# Patient Record
Sex: FEMALE | Race: WHITE | NOT HISPANIC OR LATINO | Employment: PART TIME | URBAN - METROPOLITAN AREA
[De-identification: names, ages, dates, MRNs, and addresses within clinical notes are randomized per-mention and may not be internally consistent; named-entity substitution may affect disease eponyms.]

---

## 2021-10-09 ENCOUNTER — APPOINTMENT (OUTPATIENT)
Dept: CT IMAGING | Facility: CLINIC | Age: 56
End: 2021-10-09
Payer: COMMERCIAL

## 2021-10-09 ENCOUNTER — HOSPITAL ENCOUNTER (EMERGENCY)
Facility: CLINIC | Age: 56
Discharge: HOME | End: 2021-10-09
Admitting: PHYSICIAN ASSISTANT
Payer: COMMERCIAL

## 2021-10-09 VITALS
DIASTOLIC BLOOD PRESSURE: 70 MMHG | OXYGEN SATURATION: 98 % | HEART RATE: 76 BPM | RESPIRATION RATE: 16 BRPM | TEMPERATURE: 98.8 F | WEIGHT: 193 LBS | SYSTOLIC BLOOD PRESSURE: 106 MMHG

## 2021-10-09 DIAGNOSIS — H60.391 INFECTIVE OTITIS EXTERNA, RIGHT: ICD-10-CM

## 2021-10-09 DIAGNOSIS — H66.90 OTITIS MEDIA: ICD-10-CM

## 2021-10-09 PROBLEM — E11.9 CONTROLLED TYPE 2 DIABETES MELLITUS WITHOUT COMPLICATION, WITHOUT LONG-TERM CURRENT USE OF INSULIN (H): Status: ACTIVE | Noted: 2020-10-14

## 2021-10-09 PROBLEM — R73.09 ELEVATED HEMOGLOBIN A1C: Status: ACTIVE | Noted: 2019-02-19

## 2021-10-09 PROBLEM — R73.03 BORDERLINE DIABETES MELLITUS: Status: ACTIVE | Noted: 2017-12-26

## 2021-10-09 LAB
C REACTIVE PROTEIN LHE: 3.5 MG/DL (ref 0–0.8)
ERYTHROCYTE [DISTWIDTH] IN BLOOD BY AUTOMATED COUNT: 13.3 % (ref 10–15)
ERYTHROCYTE [SEDIMENTATION RATE] IN BLOOD BY WESTERGREN METHOD: 32 MM/HR (ref 0–20)
HCT VFR BLD AUTO: 42.2 % (ref 35–47)
HGB BLD-MCNC: 13.7 G/DL (ref 11.7–15.7)
MCH RBC QN AUTO: 29.7 PG (ref 26.5–33)
MCHC RBC AUTO-ENTMCNC: 32.5 G/DL (ref 31.5–36.5)
MCV RBC AUTO: 91 FL (ref 78–100)
PLATELET # BLD AUTO: 457 10E3/UL (ref 150–450)
RBC # BLD AUTO: 4.62 10E6/UL (ref 3.8–5.2)
WBC # BLD AUTO: 13.5 10E3/UL (ref 4–11)

## 2021-10-09 PROCEDURE — 250N000013 HC RX MED GY IP 250 OP 250 PS 637: Performed by: PHYSICIAN ASSISTANT

## 2021-10-09 PROCEDURE — 250N000011 HC RX IP 250 OP 636: Performed by: PHYSICIAN ASSISTANT

## 2021-10-09 PROCEDURE — 85652 RBC SED RATE AUTOMATED: CPT | Performed by: PHYSICIAN ASSISTANT

## 2021-10-09 PROCEDURE — 96375 TX/PRO/DX INJ NEW DRUG ADDON: CPT

## 2021-10-09 PROCEDURE — 36415 COLL VENOUS BLD VENIPUNCTURE: CPT | Performed by: PHYSICIAN ASSISTANT

## 2021-10-09 PROCEDURE — 99285 EMERGENCY DEPT VISIT HI MDM: CPT | Mod: 25

## 2021-10-09 PROCEDURE — 70480 CT ORBIT/EAR/FOSSA W/O DYE: CPT

## 2021-10-09 PROCEDURE — 85014 HEMATOCRIT: CPT | Performed by: PHYSICIAN ASSISTANT

## 2021-10-09 PROCEDURE — 258N000003 HC RX IP 258 OP 636: Performed by: PHYSICIAN ASSISTANT

## 2021-10-09 PROCEDURE — 86141 C-REACTIVE PROTEIN HS: CPT | Performed by: PHYSICIAN ASSISTANT

## 2021-10-09 PROCEDURE — 96374 THER/PROPH/DIAG INJ IV PUSH: CPT

## 2021-10-09 PROCEDURE — 96361 HYDRATE IV INFUSION ADD-ON: CPT

## 2021-10-09 RX ORDER — CIPROFLOXACIN 500 MG/1
500 TABLET, FILM COATED ORAL 2 TIMES DAILY
Qty: 14 TABLET | Refills: 0 | Status: SHIPPED | OUTPATIENT
Start: 2021-10-09 | End: 2021-10-16

## 2021-10-09 RX ORDER — HYDROMORPHONE HYDROCHLORIDE 1 MG/ML
0.5 INJECTION, SOLUTION INTRAMUSCULAR; INTRAVENOUS; SUBCUTANEOUS ONCE
Status: COMPLETED | OUTPATIENT
Start: 2021-10-09 | End: 2021-10-09

## 2021-10-09 RX ORDER — MORPHINE SULFATE 4 MG/ML
4 INJECTION, SOLUTION INTRAMUSCULAR; INTRAVENOUS ONCE
Status: COMPLETED | OUTPATIENT
Start: 2021-10-09 | End: 2021-10-09

## 2021-10-09 RX ORDER — CIPROFLOXACIN 500 MG/1
500 TABLET, FILM COATED ORAL ONCE
Status: COMPLETED | OUTPATIENT
Start: 2021-10-09 | End: 2021-10-09

## 2021-10-09 RX ORDER — OXYCODONE HYDROCHLORIDE 5 MG/1
5 TABLET ORAL EVERY 8 HOURS PRN
Qty: 8 TABLET | Refills: 0 | Status: SHIPPED | OUTPATIENT
Start: 2021-10-09

## 2021-10-09 RX ORDER — OFLOXACIN 3 MG/ML
5 SOLUTION AURICULAR (OTIC) ONCE
Status: DISCONTINUED | OUTPATIENT
Start: 2021-10-09 | End: 2021-10-09

## 2021-10-09 RX ORDER — CIPROFLOXACIN AND DEXAMETHASONE 3; 1 MG/ML; MG/ML
4 SUSPENSION/ DROPS AURICULAR (OTIC) ONCE
Status: COMPLETED | OUTPATIENT
Start: 2021-10-09 | End: 2021-10-09

## 2021-10-09 RX ADMIN — CIPROFLOXACIN 500 MG: 500 TABLET, FILM COATED ORAL at 16:22

## 2021-10-09 RX ADMIN — HYDROMORPHONE HYDROCHLORIDE 0.5 MG: 1 INJECTION, SOLUTION INTRAMUSCULAR; INTRAVENOUS; SUBCUTANEOUS at 14:36

## 2021-10-09 RX ADMIN — CIPROFLOXACIN AND DEXAMETHASONE 4 DROP: 3; 1 SUSPENSION/ DROPS AURICULAR (OTIC) at 16:59

## 2021-10-09 RX ADMIN — SODIUM CHLORIDE 1000 ML: 9 INJECTION, SOLUTION INTRAVENOUS at 12:55

## 2021-10-09 RX ADMIN — MORPHINE SULFATE 4 MG: 4 INJECTION, SOLUTION INTRAMUSCULAR; INTRAVENOUS at 12:41

## 2021-10-09 ASSESSMENT — ENCOUNTER SYMPTOMS
MYALGIAS: 1
NAUSEA: 1
VOMITING: 0
HEADACHES: 0
CHILLS: 0
FEVER: 0
FACIAL SWELLING: 1

## 2021-10-09 NOTE — ED PROVIDER NOTES
EMERGENCY DEPARTMENT ENCOUNTER      NAME: Princess Zhang  AGE: 55 year old female  YOB: 1965  MRN: [id number]  EVALUATION DATE & TIME: 10/9/2021 11:28 AM    PCP: No primary care provider on file.    ED PROVIDER: Shantelle Rodney PA-C      Chief Complaint   Patient presents with     Otalgia     Facial Swelling         FINAL IMPRESSION:  1. Infective otitis externa, right    2. Otitis media          MEDICAL DECISION MAKING:    Pertinent Labs & Imaging studies reviewed. (See chart for details)  55 year old female with history of diet controlled T2DM presents to the Emergency Department for evaluation of right ear pain and swelling x 5 days. Has been treating herself with some leftover Keflex for the last 3 days without improvement. Denies fevers, chills, vomiting, headache.     Vitals reviewed and unremarkable. Patient non-toxic appearing and in no acute distress. Right EAC with moderate to severe edema, unable to visualize TM. Edema throughout auricle that extends into her right preauricular area with associated tenderness. Preauricular lymphadenopathy. No tenderness, crepitus, or erythema of the mastoid. Normal sensation throughout face, able to perform facial expressions with no motor function deficit. Differential diagnosis includes but not limited to otitis externa, malignant otitis externa, mastoiditis, herpes zoster, osteomyelitis, deep space abscess.     White count 13.5, CRP 3.5, ESR 32. CT showing right sided acute otitis externa with otomastoiditis with near complete opacification of the mastoid and middle ear cavities. No erosions. No fluid collections. Also noted to have sphenoid sinusitis. I discussed with Dr. Garcia with ENT. Plan to place ear wick and put her on ciprodex drops and oral ciprofloxacin with follow up in clinic on Monday. Ear wick placed successfully. Will send patient home with a few tablets of oxycodone given significant amount of pain she is in. We discussed return  precautions and importance of follow up. Patient discharged home in stable condition.     0 minutes of critical care    ED COURSE:  11:31 AM The PA student met the patient to gather history and perform his initial exam.  12:06 PM I met with the patient, obtained history, performed an initial exam, and discussed options and plan for diagnostics and treatment here in the ED. PPE worn: surgical mask, gloves.  3:09 PM I discussed the case with Dr. Garcia from ENT.   3:20 PM I rechecked the patient and updated her with results.     At the conclusion of the encounter I discussed the results of all of the tests and the disposition. The questions were answered. The patient acknowledged understanding and was agreeable with the care plan.     MEDICATIONS GIVEN IN THE EMERGENCY:  Medications   0.9% sodium chloride BOLUS (0 mLs Intravenous Stopped 10/9/21 1330)   morphine (PF) injection 4 mg (4 mg Intravenous Given 10/9/21 1241)   HYDROmorphone (PF) (DILAUDID) injection 0.5 mg (0.5 mg Intravenous Given 10/9/21 1436)   ciprofloxacin-dexamethasone (CIPRODEX) 0.3-0.1 % otic suspension 4 drop (4 drops Right Ear Given 10/9/21 1659)   ciprofloxacin (CIPRO) tablet 500 mg (500 mg Oral Given 10/9/21 1622)       NEW PRESCRIPTIONS STARTED AT TODAY'S ER VISIT  Discharge Medication List as of 10/9/2021  4:51 PM      START taking these medications    Details   ciprofloxacin (CIPRO) 500 MG tablet Take 1 tablet (500 mg) by mouth 2 times daily for 7 days, Disp-14 tablet, R-0, Local Print      oxyCODONE (ROXICODONE) 5 MG tablet Take 1 tablet (5 mg) by mouth every 8 hours as needed for pain, Disp-8 tablet, R-0, Local Print                  =================================================================    HPI:    Patient information was obtained from: patient    Use of Interpretor: N/A        Princess LAWERNCE Vicente is a 55 year old female with a pertinent history of borderline diabetes mellitus who presents to this ED via walk-in for evaluation of  ear pain and facial swelling.    Patient reports right ear pain and right sided facial swelling which began 5 days ago (10/4). She first noticed a small boil near her right ear that has been increasing in size. Patient states the entire right side of her face is very painful and tender. Her pain is constant and radiates throughout the right side of her face. She has been self medicating with leftover Cephalexin from a prior prescription. Has been taking Cephalexin QID x3 days without improvement. Reports decreased hearing from her right ear. Also reports nausea and body aches. Denies fever, chills, vomiting, headache.       REVIEW OF SYSTEMS:  Review of Systems   Constitutional: Negative for chills and fever.   HENT: Positive for ear pain (right), facial swelling (right) and hearing loss (right). Negative for sore throat and trouble swallowing.    Gastrointestinal: Positive for nausea. Negative for vomiting.   Musculoskeletal: Positive for myalgias. Negative for neck pain and neck stiffness.   Neurological: Negative for headaches.   All other systems reviewed and are negative.      PAST MEDICAL HISTORY:  Past Medical History:   Diagnosis Date     Anxiety      Borderline diabetes mellitus      CAD (coronary artery disease)      Depressive disorder      Hypertension      Mixed hyperlipidemia      Obesity      LAY (obstructive sleep apnea)      Tobacco use disorder        PAST SURGICAL HISTORY:  History reviewed. No pertinent surgical history.        CURRENT MEDICATIONS:    No current facility-administered medications for this encounter.    Current Outpatient Medications:      ciprofloxacin (CIPRO) 500 MG tablet, Take 1 tablet (500 mg) by mouth 2 times daily for 7 days, Disp: 14 tablet, Rfl: 0     oxyCODONE (ROXICODONE) 5 MG tablet, Take 1 tablet (5 mg) by mouth every 8 hours as needed for pain, Disp: 8 tablet, Rfl: 0      ALLERGIES:  Allergies   Allergen Reactions     Atorvastatin Muscle Pain (Myalgia)     Simvastatin  Other (See Comments)     Leg cramps     Penicillins Rash       FAMILY HISTORY:  History reviewed. No pertinent family history.    SOCIAL HISTORY:   Social History     Socioeconomic History     Marital status:      Spouse name: None     Number of children: None     Years of education: None     Highest education level: None   Occupational History     None   Tobacco Use     Smoking status: Current Every Day Smoker     Packs/day: 0.04     Years: 36.00     Pack years: 1.44     Types: Cigarettes     Smokeless tobacco: Never Used   Substance and Sexual Activity     Alcohol use: Never     Drug use: None     Sexual activity: None   Other Topics Concern     None   Social History Narrative     None     Social Determinants of Health     Financial Resource Strain:      Difficulty of Paying Living Expenses:    Food Insecurity:      Worried About Running Out of Food in the Last Year:      Ran Out of Food in the Last Year:    Transportation Needs:      Lack of Transportation (Medical):      Lack of Transportation (Non-Medical):    Physical Activity:      Days of Exercise per Week:      Minutes of Exercise per Session:    Stress:      Feeling of Stress :    Social Connections:      Frequency of Communication with Friends and Family:      Frequency of Social Gatherings with Friends and Family:      Attends [employer] Services:      Active Member of Clubs or Organizations:      Attends Club or Organization Meetings:      Marital Status:    Intimate Partner Violence:      Fear of Current or Ex-Partner:      Emotionally Abused:      Physically Abused:      Sexually Abused:        VITALS:  Patient Vitals for the past 24 hrs:   BP Temp Temp src Pulse Resp SpO2 Weight   10/09/21 1700 106/70 -- -- 76 16 98 % --   10/09/21 1624 107/69 -- -- 77 18 97 % --   10/09/21 1254 102/63 -- -- 78 18 97 % --   10/09/21 1127 96/73 98.8  F (37.1  C) Oral 86 18 96 % 87.5 kg (193 lb)       PHYSICAL EXAM    Constitutional: Well developed, obese, NAD,  smells of tobacco  HENT: Normocephalic, Atraumatic, Right EAC with moderate to severe edema, unable to visualize TM. Edema throughout auricle that extends into her right preauricular area with associated tenderness. Preauricular lymphadenopathy. No tenderness, crepitus, or erythema of the mastoid. Left TM/EAC normal. Oropharynx normal. Floor of mouth and submandibular area is soft. mucous membranes moist, Nose normal. Normal sensation throughout face. Able to perform facial expressions.   Neck: Normal range of motion, No tenderness, Supple, No stridor.   Eyes: Conjunctiva normal, No discharge.   Respiratory: Normal breath sounds, No respiratory distress, No wheezing, Speaks full sentences easily. No cough.   Cardiovascular: Normal heart rate, Regular rhythm, No murmurs, No rubs, No gallops. Chest wall nontender.   GI: Soft, No tenderness, No masses, No flank tenderness. No rebound or guarding.   Musculoskeletal: 2+ DP pulses. No edema. No cyanosis, No clubbing. Moves all extremities spontaneously.   Integument: Warm, Dry, No erythema, No rash. No petechiae.  Neurologic: Alert & oriented x 3, Normal motor function, Normal sensory function, No focal deficits noted. Normal gait.   Psychiatric: Affect normal, Judgment normal, Mood normal. Cooperative.    LAB:  All pertinent labs reviewed and interpreted.  Recent Results (from the past 24 hour(s))   CBC (+ platelets, no diff)    Collection Time: 10/09/21 12:40 PM   Result Value Ref Range    WBC Count 13.5 (H) 4.0 - 11.0 10e3/uL    RBC Count 4.62 3.80 - 5.20 10e6/uL    Hemoglobin 13.7 11.7 - 15.7 g/dL    Hematocrit 42.2 35.0 - 47.0 %    MCV 91 78 - 100 fL    MCH 29.7 26.5 - 33.0 pg    MCHC 32.5 31.5 - 36.5 g/dL    RDW 13.3 10.0 - 15.0 %    Platelet Count 457 (H) 150 - 450 10e3/uL   Erythrocyte sedimentation rate auto    Collection Time: 10/09/21 12:40 PM   Result Value Ref Range    Erythrocyte Sedimentation Rate 32 (H) 0 - 20 mm/hr   CRP inflammation    Collection Time:  10/09/21 12:40 PM   Result Value Ref Range    CRP 3.5 (H) 0.0-<0.8 mg/dL     RADIOLOGY:  Reviewed all pertinent imaging. Please see official radiology report.  CT Temporal Bones wo Contrast   Final Result   IMPRESSION:   1.  Right side acute otitis externa with circumferential soft tissue thickening throughout the external auditory canal and inflammatory change within the periauricular soft tissues. No fluid collection.   2.  Right-sided otomastoiditis with near complete opacification of the mastoid and middle ear cavities. No erosions.   3.  Sphenoid sinusitis with circumferential mucosal thickening and fluid level.          I, Gutierrez Miller, am serving as a scribe to document services personally performed by Shantelle Rodney PA-C based on my observation and the provider's statements to me. I, Shantelle Rodney PA-C attest that Gutierrez Miller is acting in a scribe capacity, has observed my performance of the services and has documented them in accordance with my direction.    Shantelle Rodney PA-C  Emergency Medicine  [facility]  10/9/2021     Shantelle Rodney PA-C  10/12/21 0903

## 2021-10-09 NOTE — ED TRIAGE NOTES
Patient reports that she has had R sided facial swelling and R ear pain since Monday, has been self medicating with Cephalexin @ home with no relief.  Moraima Maldonado RN.......10/9/2021 11:27 AM

## 2021-10-09 NOTE — PROGRESS NOTES
I spoke with Shantelle Rodney PA-C regarding this patient.  She is a 55-year-old female, history of type 2 diabetes controlled with diet.  Several day history of right facial pain, right ear pain, facial swelling, discomfort.  They obtained a CT scan of her temporal bone today which showed swelling of the external auditory canal, middle ear ear and mastoid opacification.  There is no bony destruction or coalescence.  She does not have any cranial neuropathy or signs of malignant otitis externa.    Given that the patient is treatment naïve, we would consider outpatient management.  In this specific instance, we would recommend put a topical medication and oral antibiotics.  She would be a borderline candidate for oral prednisone given her diabetes history.  In general he would recommend placing otowick in the right ear to allow the drops to get towards the eardrum.  If covered by insurance, Ciprodex would be ideal.  Good antibiotic coverage orally for otitis media/externa in this instance would be oral Augmentin, oral ciprofloxacin, clindamycin.  Patient has a remote penicillin allergy listed (rash).  If oral prednisone will not be given, ciprofloxacin 500 mg twice daily for 10 days would be a reasonable course.    Adequate pain control recommended including oral Tylenol, or ibuprofen, and potential narcotic pain medicine to help with breakthrough pain.  The patient needs to follow-up with an ENT provider early next week in clinic for examination and debridement.  She can be seen in our outpatient ENT clinic in [facility] ([phone]) or at the [facility] ([phone]).    Summary of recommendations     1. Acetaminophen 1000 mg every 6 hours as needed  2. Ibuprofen 600 mg every 6 hours as needed  3. Oral narcotic pain medication for breakthrough pain as needed  4. Oral antibiotic as prescribed  5. Ciprodex drops 5 drops to draining ear twice daily for 14 days  6. Ear debridement in ENT clinic in  3-5 days     Jeronimo Garcia MD  Department of Otolaryngology-Head and Neck Surgery  [facility]

## 2021-10-09 NOTE — DISCHARGE INSTRUCTIONS
Tylenol 1000 mg every 8 hours  Ibuprofen 600 mg every 8 hours  Oxycodone 5 mg every 8 hours as needed for severe pain  Drops: 4 drops twice daily for 7 days  Ciprofloxacin: 500 mg twice daily for 7 days.     Call to schedule follow up with ENT on Monday or Tuesday

## 2021-10-12 ASSESSMENT — ENCOUNTER SYMPTOMS
NECK STIFFNESS: 0
SORE THROAT: 0
NECK PAIN: 0
TROUBLE SWALLOWING: 0

## 2023-10-25 ENCOUNTER — HOSPITAL ENCOUNTER (EMERGENCY)
Facility: CLINIC | Age: 58
Discharge: HOME | End: 2023-10-25
Attending: STUDENT IN AN ORGANIZED HEALTH CARE EDUCATION/TRAINING PROGRAM | Admitting: STUDENT IN AN ORGANIZED HEALTH CARE EDUCATION/TRAINING PROGRAM
Payer: COMMERCIAL

## 2023-10-25 VITALS
SYSTOLIC BLOOD PRESSURE: 112 MMHG | OXYGEN SATURATION: 98 % | HEIGHT: 64 IN | BODY MASS INDEX: 31.58 KG/M2 | DIASTOLIC BLOOD PRESSURE: 69 MMHG | HEART RATE: 71 BPM | WEIGHT: 185 LBS | TEMPERATURE: 98.5 F | RESPIRATION RATE: 20 BRPM

## 2023-10-25 DIAGNOSIS — H53.8 BLURRED VISION: ICD-10-CM

## 2023-10-25 PROCEDURE — 99282 EMERGENCY DEPT VISIT SF MDM: CPT

## 2023-10-25 ASSESSMENT — ENCOUNTER SYMPTOMS
SHORTNESS OF BREATH: 0
HEADACHES: 1

## 2023-10-25 ASSESSMENT — ACTIVITIES OF DAILY LIVING (ADL): ADLS_ACUITY_SCORE: 33

## 2023-10-25 NOTE — DISCHARGE INSTRUCTIONS
You may still have an issue occurring such as a TIA or something more serious.  It is not possible for us to evaluate that without doing an MRI at this time.    Following up with Dr. Avery tomorrow is appropriate and may help with the diagnosis.  In addition you wlll need to see Dr. Sosa and perhaps a neurologist and an eye doctor for your symptoms.    Please return to the ER if your symtpoms worsen or you have any other neurologic symptoms.

## 2023-10-25 NOTE — ED TRIAGE NOTES
"Arrives to ED with c/o intermittent blurry vision to L eye over past 2 weeks. Denies vision changes at this time. 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 Aneurysm has been coiled. \"I had surgery in June to remove the blockages\" from bilat carotid arteries. Reports has had L sided facial droop since procedure in June, \"they said it's supposed to get better\". Denies N/T. Strength equal to BUE/BLE.      Triage Assessment (Adult)       Row Name 10/25/23 1216          Triage Assessment    Airway WDL WDL        Respiratory WDL    Respiratory WDL WDL        Skin Circulation/Temperature WDL    Skin Circulation/Temperature WDL WDL        Cardiac WDL    Cardiac WDL WDL        Peripheral/Neurovascular WDL    Peripheral Neurovascular WDL WDL        Cognitive/Neuro/Behavioral WDL    Cognitive/Neuro/Behavioral WDL WDL                     "

## 2023-10-25 NOTE — ED PROVIDER NOTES
EMERGENCY DEPARTMENT ENCOUNTER      NAME: Princess Zhang  AGE: 58 year old female  YOB: 1965  MRN: [id number]  EVALUATION DATE & TIME: No admission date for patient encounter.    PCP: Cassi Sosa    ED PROVIDER: Frank Corona M.D.      Chief Complaint   Patient presents with    Eye Problem         FINAL IMPRESSION:  1. Blurred vision          ED COURSE & MEDICAL DECISION MAKIN:40 PM Met and evaluated patient    Pertinent Labs & Imaging studies reviewed. (See chart for details)  58 year old female presents to the Emergency Department for evaluation of blurred vision in her eye.  Patient has a complicated history of having an aneurysm which was coiled of years ago.  She has not had any headache.  No other strokelike symptoms.  Patient did have previous carotid surgery and is known to have carotid disease.  She had talked to Dr. Avery earlier this week who had suggested carotid ultrasounds or to be seen in the emergency department for work-up.  Patient had not seen a physician or been to an ER since then and does have an appointment tomorrow to see Dr. Avery and to have an ultrasound.  Today she was here in the emergency department with her mother and decided to be seen so we could consider imaging or further work-up.  I did suggest to her that MRI of her brain and carotids would be valuable to rule out an acute stroke.  Do not believe that this represents any sort of ophthalmic artery issue.  Also do not believe that diabetes or any other systemic issues causing the blurred vision.  Had a lengthy discussion with the patient regarding the need for MRI and the fact that it might take some time to get that done and after our discussion the patient ultimately decided that she did not want to wait for the MRI and would like to leave and follow-up with Dr. Avery tomorrow.  She knows the risk that she could be in fact having a stroke or even a TIA and that we cannot rule this out without  a MRI.    At the conclusion of the encounter I discussed the results of all of the tests and the disposition. The questions were answered. The patient or family acknowledged understanding and was agreeable with the care plan.              Medical Decision Making    History:  Supplemental history from: Documented in chart, if applicable  External Record(s) reviewed: Documented in chart, if applicable.    Work Up:  Chart documentation includes differential considered and any EKGs or imaging independently interpreted by provider, where specified.  In additional to work up documented, I considered the following work up: Documented in chart, if applicable.    External consultation:  Discussion of management with another provider: Documented in chart, if applicable    Complicating factors:  Care impacted by chronic illness: Hyperlipidemia, Hypertension, and Mental Health  Care affected by social determinants of health: N/A    Disposition considerations: Discharge. No recommendations on prescription strength medication(s). See documentation for any additional details.        This patient involved a high degree of complexity in medical decision making, as significant risks were present and assessed. Recent encounters & results in medical record reviewed by me.     All workup (i.e. any EKG/labs/imaging as per charting below) reviewed and independently interpreted by me. See respective sections for details.       minutes of critical care time     MEDICATIONS GIVEN IN THE EMERGENCY:  Medications - No data to display    NEW PRESCRIPTIONS STARTED AT TODAY'S ER VISIT  Discharge Medication List as of 10/25/2023  1:05 PM             =================================================================    HPI    Patient information was obtained from: Patient    Use of : Rosa LAWRENCE Vicente is a 58 year old female with a pertinent history of hypertension, mixed hyperlipidemia, CAD who presents for evaluation of eye  "problem.     The patient reports that for the past 3 weeks she has been experiencing blurred vision in her right eye. She explained that once a day, for 3 minutes she experiences a sudden onset of blurry vision and feels off balance. These episodes have not worsened. Of note, she reported that she started new blood pressure medication and experiences bad headaches from it.       REVIEW OF SYSTEMS   Review of Systems   Eyes:  Positive for visual disturbance (blurred vision).   Respiratory:  Negative for shortness of breath.    Cardiovascular:  Negative for chest pain.   Neurological:  Positive for headaches.   All other systems reviewed and are negative.      PAST MEDICAL HISTORY:  Past Medical History:   Diagnosis Date    Anxiety     Borderline diabetes mellitus     CAD (coronary artery disease)     Depressive disorder     Hypertension     Mixed hyperlipidemia     Obesity     LAY (obstructive sleep apnea)     Tobacco use disorder        PAST SURGICAL HISTORY:  History reviewed. No pertinent surgical history.        CURRENT MEDICATIONS:    oxyCODONE (ROXICODONE) 5 MG tablet        ALLERGIES:  Allergies   Allergen Reactions    Atorvastatin Muscle Pain (Myalgia)    Simvastatin Other (See Comments)     Leg cramps    Penicillins Rash       FAMILY HISTORY:  History reviewed. No pertinent family history.    SOCIAL HISTORY:   Social History     Socioeconomic History    Marital status:    Tobacco Use    Smoking status: Every Day     Packs/day: 0.04     Years: 36.00     Additional pack years: 0.00     Total pack years: 1.44     Types: Cigarettes    Smokeless tobacco: Never   Substance and Sexual Activity    Alcohol use: Never       VITALS:  /69   Pulse 71   Temp 98.5  F (36.9  C) (Temporal)   Resp 20   Ht 1.626 m (5' 4\")   Wt 83.9 kg (185 lb)   SpO2 98%   BMI 31.76 kg/m        PHYSICAL EXAM    VITAL SIGNS: @VS@  Constitutional:  Well developed, well nourished,    EYES: Conjunctivae clear, no " discharge  HENT: Atraumatic, normocephalic, bilateral external ears normal.  Oropharynx moist. Nose normal.   Neck: Normal ROM , Supple   Respiratory:  No respiratory distress, normal nonlabored respirations.   Cardiovascular:  Distal perfusion appears intact  Musculoskeletal:  No edema appreciated, No cyanosis, No clubbing. Good range of motion in all major joints.   Integument:  Warm, Dry, No erythema, No rash.   Neurologic:  Patient is alert and oriented ×3.  Face is symmetric.  Speech is normal.  Visual fields are full.  Cranial nerves II through XII are intact.  Strength is full and equal in both upper and lower extremities.  Sensory is intact to sharp and light touch.  Patient has a normal steady gait.  Coordination is intact.  Normal Romberg and finger nose to finger.  Reflexes are 2+ throughout.  Toes are downgoing.   Psychiatric:  Affect normal        I, Blessing Gray, am serving as a scribe to document services personally performed by Dr. Frank Corona based on my observation and the provider's statements to me. IFrank MD attest that Blessing Gray is acting in a scribe capacity, has observed my performance of the services and has documented them in accordance with my direction.    Frank Corona M.D.  Emergency Medicine  [facility] EMERGENCY ROOM  [address] [place] [id number] [phone]  Dept: [phone]      rFank Corona MD  10/31/23 1964       Frank Corona MD  10/31/23 6605

## 2025-09-02 PROBLEM — R59.9 LYMPH NODE ENLARGEMENT: Status: ACTIVE | Noted: 2022-11-03

## 2025-09-02 PROBLEM — J06.9 UPPER RESPIRATORY INFECTION: Status: ACTIVE | Noted: 2022-10-31

## 2025-09-02 PROBLEM — I65.23 BILATERAL CAROTID ARTERY STENOSIS: Status: ACTIVE | Noted: 2022-10-30

## 2025-09-02 PROBLEM — R29.810 FACIAL WEAKNESS: Status: ACTIVE | Noted: 2025-09-02

## 2025-09-02 PROBLEM — N17.9 ACUTE KIDNEY INJURY: Status: ACTIVE | Noted: 2022-11-01

## 2025-09-02 PROBLEM — I67.1 ANEURYSM, CEREBRAL: Status: ACTIVE | Noted: 2022-10-31

## 2025-09-04 ENCOUNTER — TELEPHONE (OUTPATIENT)
Dept: VASCULAR SURGERY | Facility: CLINIC | Age: 60
End: 2025-09-04

## 2025-09-04 ENCOUNTER — OFFICE VISIT (OUTPATIENT)
Dept: FAMILY MEDICINE | Facility: CLINIC | Age: 60
End: 2025-09-04
Payer: COMMERCIAL

## 2025-09-04 VITALS
DIASTOLIC BLOOD PRESSURE: 80 MMHG | HEIGHT: 64 IN | SYSTOLIC BLOOD PRESSURE: 120 MMHG | RESPIRATION RATE: 20 BRPM | WEIGHT: 177.4 LBS | TEMPERATURE: 97.6 F | HEART RATE: 85 BPM | BODY MASS INDEX: 30.29 KG/M2 | OXYGEN SATURATION: 97 %

## 2025-09-04 DIAGNOSIS — E11.9 CONTROLLED TYPE 2 DIABETES MELLITUS WITHOUT COMPLICATION, WITHOUT LONG-TERM CURRENT USE OF INSULIN (H): Primary | ICD-10-CM

## 2025-09-04 DIAGNOSIS — Z11.4 SCREENING FOR HIV (HUMAN IMMUNODEFICIENCY VIRUS): ICD-10-CM

## 2025-09-04 DIAGNOSIS — Z13.6 SCREENING FOR CARDIOVASCULAR CONDITION: ICD-10-CM

## 2025-09-04 DIAGNOSIS — Z12.4 CERVICAL CANCER SCREENING: ICD-10-CM

## 2025-09-04 DIAGNOSIS — R07.9 CHEST PAIN, UNSPECIFIED TYPE: ICD-10-CM

## 2025-09-04 DIAGNOSIS — I65.23 BILATERAL CAROTID ARTERY STENOSIS: ICD-10-CM

## 2025-09-04 DIAGNOSIS — Z12.31 VISIT FOR SCREENING MAMMOGRAM: ICD-10-CM

## 2025-09-04 DIAGNOSIS — I25.10 CORONARY ARTERY DISEASE INVOLVING NATIVE CORONARY ARTERY OF NATIVE HEART WITHOUT ANGINA PECTORIS: ICD-10-CM

## 2025-09-04 DIAGNOSIS — Z11.59 NEED FOR HEPATITIS C SCREENING TEST: ICD-10-CM

## 2025-09-04 DIAGNOSIS — Z12.11 SCREEN FOR COLON CANCER: ICD-10-CM

## 2025-09-04 DIAGNOSIS — I10 ESSENTIAL HYPERTENSION: ICD-10-CM

## 2025-09-04 LAB
EST. AVERAGE GLUCOSE BLD GHB EST-MCNC: 126 MG/DL
HBA1C MFR BLD: 6 % (ref 0–5.6)

## 2025-09-04 RX ORDER — BUPROPION HYDROCHLORIDE 300 MG/1
300 TABLET ORAL
COMMUNITY
Start: 2025-07-09

## 2025-09-04 RX ORDER — METFORMIN HYDROCHLORIDE 500 MG/1
1000 TABLET, EXTENDED RELEASE ORAL
COMMUNITY
Start: 2025-07-02

## 2025-09-04 RX ORDER — VILAZODONE HYDROCHLORIDE 10 MG/1
TABLET ORAL
COMMUNITY
Start: 2025-07-09

## 2025-09-04 RX ORDER — ONDANSETRON 4 MG/1
4 TABLET, ORALLY DISINTEGRATING ORAL
COMMUNITY
Start: 2025-05-01

## 2025-09-04 RX ORDER — ISOSORBIDE MONONITRATE 30 MG/1
90 TABLET, EXTENDED RELEASE ORAL
COMMUNITY
Start: 2025-07-25

## 2025-09-04 RX ORDER — AMLODIPINE BESYLATE 5 MG/1
5 TABLET ORAL DAILY
COMMUNITY
Start: 2024-11-15 | End: 2025-11-15

## 2025-09-04 RX ORDER — NITROGLYCERIN 0.4 MG/1
0.4 TABLET SUBLINGUAL
COMMUNITY
Start: 2024-10-09

## 2025-09-04 RX ORDER — DULAGLUTIDE 0.75 MG/.5ML
0.75 INJECTION, SOLUTION SUBCUTANEOUS WEEKLY
COMMUNITY
Start: 2025-07-30

## 2025-09-04 ASSESSMENT — PATIENT HEALTH QUESTIONNAIRE - PHQ9
SUM OF ALL RESPONSES TO PHQ QUESTIONS 1-9: 10
SUM OF ALL RESPONSES TO PHQ QUESTIONS 1-9: 10
10. IF YOU CHECKED OFF ANY PROBLEMS, HOW DIFFICULT HAVE THESE PROBLEMS MADE IT FOR YOU TO DO YOUR WORK, TAKE CARE OF THINGS AT HOME, OR GET ALONG WITH OTHER PEOPLE: SOMEWHAT DIFFICULT